# Patient Record
Sex: MALE | Race: AMERICAN INDIAN OR ALASKA NATIVE
[De-identification: names, ages, dates, MRNs, and addresses within clinical notes are randomized per-mention and may not be internally consistent; named-entity substitution may affect disease eponyms.]

---

## 2017-07-04 ENCOUNTER — HOSPITAL ENCOUNTER (EMERGENCY)
Dept: HOSPITAL 46 - ED | Age: 52
Discharge: HOME | End: 2017-07-04
Payer: COMMERCIAL

## 2017-07-04 VITALS — BODY MASS INDEX: 34.53 KG/M2 | HEIGHT: 67 IN | WEIGHT: 220 LBS
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
## 2017-07-04 DIAGNOSIS — I10: ICD-10-CM

## 2017-07-04 DIAGNOSIS — S80.11XA: Primary | ICD-10-CM

## 2017-08-08 ENCOUNTER — HOSPITAL ENCOUNTER (EMERGENCY)
Dept: HOSPITAL 46 - ED | Age: 52
Discharge: HOME | End: 2017-08-08
Payer: COMMERCIAL

## 2017-08-08 VITALS — WEIGHT: 209.99 LBS | HEIGHT: 68 IN | BODY MASS INDEX: 31.83 KG/M2

## 2017-08-08 DIAGNOSIS — I10: ICD-10-CM

## 2017-08-08 DIAGNOSIS — Z79.899: ICD-10-CM

## 2017-08-08 DIAGNOSIS — Z79.51: ICD-10-CM

## 2017-08-08 DIAGNOSIS — Z88.5: ICD-10-CM

## 2017-08-08 DIAGNOSIS — Z88.1: ICD-10-CM

## 2017-08-08 DIAGNOSIS — R07.2: Primary | ICD-10-CM

## 2017-08-08 DIAGNOSIS — Z98.890: ICD-10-CM

## 2017-11-06 ENCOUNTER — HOSPITAL ENCOUNTER (EMERGENCY)
Dept: HOSPITAL 46 - ED | Age: 52
Discharge: HOME | End: 2017-11-06
Payer: COMMERCIAL

## 2017-11-06 VITALS — BODY MASS INDEX: 31.07 KG/M2 | HEIGHT: 68 IN | WEIGHT: 205 LBS

## 2017-11-06 DIAGNOSIS — G43.909: Primary | ICD-10-CM

## 2017-11-06 DIAGNOSIS — Z88.1: ICD-10-CM

## 2017-11-06 DIAGNOSIS — Z88.5: ICD-10-CM

## 2017-11-06 DIAGNOSIS — Z79.51: ICD-10-CM

## 2017-11-06 DIAGNOSIS — Z79.899: ICD-10-CM

## 2018-12-27 NOTE — EKG
[facility]
                                    [address] [place]  [address]
_________________________________________________________________________________________
                                                                 Signed   
 
 
Normal sinus rhythm with sinus arrhythmia
Left axis deviation
Pulmonary disease pattern
Abnormal ECG
No previous ECGs available
Confirmed by JOE WICK MD ([id number]) on 8/8/2017 8:04:47 PM
 
 
 
 
 
 
 
 
 
 
 
 
 
 
 
 
 
 
 
 
 
 
 
 
 
 
 
 
 
 
 
 
 
 
 
 
 
 
 
    Electronically Signed By: JOE WICK MD  08/08/17 2004
_________________________________________________________________________________________
PATIENT NAME:     SALEEM WALKER                
MEDICAL RECORD #: [id number]                     Electrocardiogram             
          ACCT #: [id number]  
DATE OF BIRTH:   [birth year]                                       
PHYSICIAN:   JOE WICK MD           REPORT #: [id number]
REPORT IS CONFIDENTIAL AND NOT TO BE RELEASED WITHOUT AUTHORIZATION oral

## 2019-08-22 ENCOUNTER — HOSPITAL ENCOUNTER (EMERGENCY)
Dept: HOSPITAL 46 - ED | Age: 54
Discharge: HOME | End: 2019-08-22
Payer: COMMERCIAL

## 2019-08-22 VITALS — WEIGHT: 205 LBS | BODY MASS INDEX: 31.07 KG/M2 | HEIGHT: 68 IN

## 2019-08-22 DIAGNOSIS — F41.9: ICD-10-CM

## 2019-08-22 DIAGNOSIS — Z87.891: ICD-10-CM

## 2019-08-22 DIAGNOSIS — Z79.899: ICD-10-CM

## 2019-08-22 DIAGNOSIS — I10: ICD-10-CM

## 2019-08-22 DIAGNOSIS — Z88.0: ICD-10-CM

## 2019-08-22 DIAGNOSIS — Z88.6: ICD-10-CM

## 2019-08-22 DIAGNOSIS — R07.9: Primary | ICD-10-CM

## 2019-08-22 NOTE — EKG
[facility]
                                    [address] [place]  [address]
_________________________________________________________________________________________
                                                                 Signed   
 
 
Normal sinus rhythm
Left axis deviation
Pulmonary disease pattern
Abnormal ECG
When compared with ECG of 08-AUG-2017 14:45,
T wave inversion no longer evident in Inferior leads
Confirmed by SALEEM BRUNO DO ([id number]) on 8/22/2019 3:17:41 PM
 
 
 
 
 
 
 
 
 
 
 
 
 
 
 
 
 
 
 
 
 
 
 
 
 
 
 
 
 
 
 
 
 
 
 
 
 
 
    Electronically Signed By: SALEEM BRUNO DO  08/22/19 1517
_________________________________________________________________________________________
PATIENT NAME:     SALEEM WALKER                
MEDICAL RECORD #: [id number]                     Electrocardiogram             
          ACCT #: [id number]  
DATE OF BIRTH:   [birth year]                                       
PHYSICIAN:   SALEEM BRUNO DO                     REPORT #: [id number]
REPORT IS CONFIDENTIAL AND NOT TO BE RELEASED WITHOUT AUTHORIZATION

## 2019-09-25 ENCOUNTER — HOSPITAL ENCOUNTER (EMERGENCY)
Dept: HOSPITAL 46 - ED | Age: 54
Discharge: HOME | End: 2019-09-25
Payer: COMMERCIAL

## 2019-09-25 VITALS — WEIGHT: 188.01 LBS | BODY MASS INDEX: 28.49 KG/M2 | HEIGHT: 68 IN

## 2019-09-25 DIAGNOSIS — S60.561A: Primary | ICD-10-CM

## 2019-09-25 DIAGNOSIS — Z88.5: ICD-10-CM
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
## 2019-09-25 DIAGNOSIS — Z79.899: ICD-10-CM

## 2019-09-25 DIAGNOSIS — I10: ICD-10-CM

## 2019-09-25 DIAGNOSIS — Z88.0: ICD-10-CM

## 2021-08-03 ENCOUNTER — HOSPITAL ENCOUNTER (EMERGENCY)
Dept: HOSPITAL 46 - ED | Age: 56
Discharge: HOME | End: 2021-08-03
Payer: COMMERCIAL

## 2021-08-03 VITALS — HEIGHT: 68 IN | BODY MASS INDEX: 28.49 KG/M2 | WEIGHT: 188.01 LBS

## 2021-08-03 DIAGNOSIS — Z88.0: ICD-10-CM

## 2021-08-03 DIAGNOSIS — Z88.5: ICD-10-CM

## 2021-08-03 DIAGNOSIS — I10: ICD-10-CM

## 2021-08-03 DIAGNOSIS — T63.441A: Primary | ICD-10-CM

## 2021-08-03 DIAGNOSIS — Z79.899: ICD-10-CM

## 2021-08-03 NOTE — XMS
PreManage Notification: SALEEM WALKER MRN:[id number]
 
Security Information
 
Security Events
No recent Security Events currently on file
 
 
 
CRITERIA MET
------------
- [facility] - 2 Visits in 30 Days
 
 
CARE PROVIDERS
There are no care providers on record at this time.
 
Ena has no Care Guidelines for this patient.
 
[facility] VISIT COUNT (12 MO.)
-------------------------------------------------------------------------------------
[facility]
 
1 [facility]
-------------------------------------------------------------------------------------
TOTAL 3
-------------------------------------------------------------------------------------
NOTE: Visits indicate total known visits.
 
ED/C VISIT TRACKING (12 MO.)
-------------------------------------------------------------------------------------
[facility] [place]
 
[facility]
 
COMPLAINT:
- BEE STING
-------------------------------------------------------------------------------------
[facility]2021 10[facility]
 
[facility]
 
DIAGNOSES:
- Generalized anxiety disorder
- Chest Pain
- Extremity Weakness
- Poss Stoke/Left Side
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
-------------------------------------------------------------------------------------
10/02/2020 12:27
[facility]
 
[facility]
 
DIAGNOSES:
- Fall/Back/ R Hip
- Pain in unspecified hip
 
- Pelvic Pain
- Fall
- Unspecified fall, initial encounter
-------------------------------------------------------------------------------------
 
 
INPATIENT VISIT TRACKING (12 MO.)
No inpatient visits to display in this time frame
 
[email or web address]

## 2022-02-03 ENCOUNTER — HOSPITAL ENCOUNTER (EMERGENCY)
Dept: HOSPITAL 46 - ED | Age: 57
LOS: 1 days | Discharge: HOME | End: 2022-02-04
Payer: COMMERCIAL

## 2022-02-03 VITALS — WEIGHT: 196.87 LBS | HEIGHT: 68 IN | BODY MASS INDEX: 29.84 KG/M2

## 2022-02-03 DIAGNOSIS — F41.9: ICD-10-CM

## 2022-02-03 DIAGNOSIS — I10: ICD-10-CM

## 2022-02-03 DIAGNOSIS — R07.89: Primary | ICD-10-CM

## 2022-02-03 DIAGNOSIS — Z88.5: ICD-10-CM

## 2022-02-03 DIAGNOSIS — Z88.0: ICD-10-CM

## 2022-02-03 DIAGNOSIS — Z79.899: ICD-10-CM

## 2022-02-06 NOTE — EKG
[facility]
                                    [address] [place]  [address]
_________________________________________________________________________________________
                                                                 Signed   
 
 
Sinus tachycardia
Left axis deviation
Inferior infarct , age undetermined
Abnormal ECG
When compared with ECG of 22-AUG-2019 10:57,
No significant change was found
Confirmed by JOE WICK MD ([id number]) on 2/6/2022 5:49:54 PM
 
 
 
 
 
 
 
 
 
 
 
 
 
 
 
 
 
 
 
 
 
 
 
 
 
 
 
 
 
 
 
 
 
 
 
 
 
 
    Electronically Signed By: JOE WICK MD  02/06/22 1750
_________________________________________________________________________________________
PATIENT NAME:     SALEEM WALKER                
MEDICAL RECORD #: [id number]                     Electrocardiogram             
          ACCT #: [id number]  
DATE OF BIRTH:   [birth year]                                       
PHYSICIAN:   JOE WICK MD           REPORT #: [id number]
REPORT IS CONFIDENTIAL AND NOT TO BE RELEASED WITHOUT AUTHORIZATION

## 2023-05-01 ENCOUNTER — HOSPITAL ENCOUNTER (EMERGENCY)
Dept: HOSPITAL 46 - ED | Age: 58
Discharge: HOME | End: 2023-05-01
Payer: COMMERCIAL

## 2023-05-01 VITALS — BODY MASS INDEX: 31.31 KG/M2 | WEIGHT: 206.57 LBS | HEIGHT: 68 IN

## 2023-05-01 VITALS — SYSTOLIC BLOOD PRESSURE: 164 MMHG | DIASTOLIC BLOOD PRESSURE: 109 MMHG

## 2023-05-01 DIAGNOSIS — S80.12XA: Primary | ICD-10-CM

## 2023-05-01 DIAGNOSIS — Z79.899: ICD-10-CM

## 2023-05-01 DIAGNOSIS — Z88.5: ICD-10-CM
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
## 2023-05-01 DIAGNOSIS — I10: ICD-10-CM

## 2023-05-01 DIAGNOSIS — Z88.0: ICD-10-CM
